# Patient Record
Sex: FEMALE | Race: WHITE | Employment: OTHER | URBAN - METROPOLITAN AREA
[De-identification: names, ages, dates, MRNs, and addresses within clinical notes are randomized per-mention and may not be internally consistent; named-entity substitution may affect disease eponyms.]

---

## 2017-12-11 ENCOUNTER — CARE COORDINATION (OUTPATIENT)
Dept: SURGERY | Facility: CLINIC | Age: 47
End: 2017-12-11

## 2019-03-06 NOTE — PROGRESS NOTES
New Pt - Wants a Revision  Received: Today       Bethany Perez Christina, ARNOLDO; Sharon Willett, RN       Phone Number: [phone]                     The pt is asking for a revision. The pt saw Dr Florian at [place] around 2002. The pt had Debora n Y.     Please call the pt at [phone]     Thanks - Bethany     Please DO NOT send this message and/or reply back to sender.  Call Center Representatives DO NOT respond to messages.           Called patient to discuss. Advised patient that our team does not perform revisional surgery on gastric bypass. We are referring patient to see [facility] team- non surgical weight management.     Patient verbalized understanding and agreed with plan.   Normal vision: sees adequately in most situations; can see medication labels, newsprint

## 2021-08-25 NOTE — TELEPHONE ENCOUNTER
DIAGNOSIS: right foot, has had multiple injuries over the years-requesting to see sports/ortho doc/ images being pushed from [facility]   APPOINTMENT DATE: 9.3.21   NOTES STATUS DETAILS   OFFICE NOTE from referring provider N/A    OFFICE NOTE from other specialist Care Everywhere 6.25.21 [facility]  12.23.15 [facility]   DISCHARGE SUMMARY from hospital N/A    DISCHARGE REPORT from the ER Care Everywhere 8.30.20 [facility]  4.18.16 [facility]   OPERATIVE REPORT N/A    EMG report N/A    MEDICATION LIST Care Everywhere    MRI N/A    DEXA (osteoporosis/bone health) N/A    CT SCAN N/A    XRAYS (IMAGES & REPORTS) PACS 6.25.21 L ankle, L foot  8.30.20 L foot  4.18.16 L ankle, L foot, L knee, L tib fib  12.23.15 L foot, L ankle     Action 8.25.21 8:17 AM ROYAL   Action Taken Called [facility] and requested images

## 2021-09-03 ENCOUNTER — PRE VISIT (OUTPATIENT)
Dept: ORTHOPEDICS | Facility: CLINIC | Age: 51
End: 2021-09-03